# Patient Record
Sex: FEMALE | Race: WHITE | NOT HISPANIC OR LATINO | URBAN - METROPOLITAN AREA
[De-identification: names, ages, dates, MRNs, and addresses within clinical notes are randomized per-mention and may not be internally consistent; named-entity substitution may affect disease eponyms.]

---

## 2024-03-12 ENCOUNTER — MEDICAL CORRESPONDENCE (OUTPATIENT)
Dept: HEALTH INFORMATION MANAGEMENT | Facility: CLINIC | Age: 49
End: 2024-03-12
Payer: COMMERCIAL

## 2024-03-12 ENCOUNTER — TRANSFERRED RECORDS (OUTPATIENT)
Dept: HEALTH INFORMATION MANAGEMENT | Facility: CLINIC | Age: 49
End: 2024-03-12
Payer: COMMERCIAL

## 2024-03-12 LAB — PHQ9 SCORE: 4

## 2024-03-22 ENCOUNTER — TRANSCRIBE ORDERS (OUTPATIENT)
Dept: OTHER | Age: 49
End: 2024-03-22

## 2024-03-22 ENCOUNTER — PATIENT OUTREACH (OUTPATIENT)
Dept: ONCOLOGY | Facility: CLINIC | Age: 49
End: 2024-03-22
Payer: COMMERCIAL

## 2024-03-22 DIAGNOSIS — Z80.3 FAMILY HISTORY OF MALIGNANT NEOPLASM OF BREAST: Primary | ICD-10-CM

## 2024-03-22 DIAGNOSIS — Z80.3 FAMILY HISTORY OF BREAST CANCER: ICD-10-CM

## 2024-03-22 NOTE — PROGRESS NOTES
[place] received Cancer Risk Management Program referral, referred for:    Referral for consult for genetic counseling for Family history of breast cancer; Family history of malignant neoplasm of breast. Referred by Dr. Taya Stephens MD at [facility]      Reviewed for appropriate plan, and sent to New Patient Scheduling for completion.

## 2024-08-01 ENCOUNTER — VIRTUAL VISIT (OUTPATIENT)
Dept: ONCOLOGY | Facility: CLINIC | Age: 49
End: 2024-08-01
Attending: GENETIC COUNSELOR, MS
Payer: COMMERCIAL

## 2024-08-01 DIAGNOSIS — Z80.41 FAMILY HISTORY OF MALIGNANT NEOPLASM OF OVARY: Primary | ICD-10-CM

## 2024-08-01 DIAGNOSIS — Z80.3 FAMILY HISTORY OF MALIGNANT NEOPLASM OF BREAST: ICD-10-CM

## 2024-08-01 DIAGNOSIS — Z80.8 FAMILY HISTORY OF MELANOMA: ICD-10-CM

## 2024-08-01 PROCEDURE — 96040 HC GENETIC COUNSELING, EACH 30 MINUTES: CPT | Mod: GT,95 | Performed by: GENETIC COUNSELOR, MS

## 2024-08-01 NOTE — NURSING NOTE
[facility] [address]      Is the patient currently in the state of [place]? YES    Visit mode:VIDEO    If the visit is dropped, the patient can be reconnected by: VIDEO VISIT: Text to cell phone:   Telephone Information:   Mobile [phone]       Will anyone else be joining the visit? NO  (If patient encounters technical issues they should call [phone]    How would you like to obtain your AVS? MyChart    Are changes needed to the allergy or medication list? N/A    Reason for visit: Consult      Katarina PEREZ

## 2024-08-01 NOTE — LETTER
8/1/2024      [place]az Soriano  [address] [address] [facility] [address]      Dear Colleague,    Thank you for referring your patient, Sanaz Soriano, to the [facility]. Please see a copy of my visit note below.    Virtual Visit Details    Type of service:  Video Visit   Video Start Time:  11:23 am  Video End Time: 12:04 pm    Originating Location (pt. Location): Home  Distant Location (provider location):  Off-site  Platform used for Video Visit: [facility]    8/1/2024    Referring Provider: Taya Stephens MD      Presenting Information:   I spoke with Sanaz Soriano over video today for genetic counseling to discuss her family history of cancer. This appointment was conducted virtually due to COVID-19 precautions. We talked today to review this history, cancer screening recommendations, and available genetic testing options.    Personal History:  Sanaz is a 48 year old female. She does not have any personal history of cancer.     She had her first menstrual period in 7th grade, her first child at age 26, and is premenopausal. Sanaz has her ovaries, fallopian tubes and uterus in place. She reports that she had a uterine ablation about 2 years ago. She reports that she has not used hormone replacement therapy. She has used oral contraceptives. She has clinical breast exams and mammograms and reports that her most recent mammogram was in 2024. 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 Sanaz has not had a colonoscopy. Sanaz reported former tobacco use and rare alcohol use.    Family History: (Please see scanned pedigree for detailed family history information)  Children:  She has one son (age 22) and two daughters (ages 10 and 7). Her 10 year old daughter has autism, developmental delay, epilepsy, vision problems, and other health concerns. She has had some genetic testing here at [facility] in the  past. Sanaz reports that there was an uncertain finding in a gene related to vision loss.   Siblings:  Her brother is 50 years old with no known history of cancer.   His son (Sanaz's nephew) is 17 years old and had something benign removed from his brain recently, although she cannot remember the exact details of this.   Maternal:  Her mother is 73 years old and was diagnosed with breast cancer in 2010 at age 59. Treatment included bilateral mastectomy. She also has a history of ocular melanoma at age 47 in 1998. Treatment included radiation seeds behind the eye.   Her grandmother was diagnosed with ovarian cancer in her 70s and passed away at age 72.   Her grandfather was diagnosed with bladder cancer and passed away in his late 60s-70s.   Paternal:  Her father passed away at age 66 with no known history of cancer. He had diabetes and dementia.   Her grandfather had lung cancer and passed away at age 87. He possibly had exposures due to his work as a farmer and mold in the basement of the home.     Sanaz is not aware of any genetic testing for inherited cancer risk in any of her family members.     Her maternal and paternal ancestry is unknown.      Discussion:  Sanaz's family history of cancer is suggestive of a hereditary cancer syndrome.  We reviewed the features of sporadic, familial, and hereditary cancers. In looking at Sanaz's family history, it is possible that a cancer susceptibility gene is present due to her maternal family history of breast, ovarian, and ocular melanoma.  We discussed the natural history and genetics of hereditary cancer. Based on her family history, we discussed genes in which mutations are associated with an increased risk for breast and ovarian cancer, such as the BRCA1 and BRCA2 genes. Mutations in these genes cause a condition known as Hereditary Breast and Ovarian Cancer syndrome (HBOC). Women with a mutation in either of these genes are at increased risk for breast  and ovarian cancer. There is also an increased risk for a second primary breast cancer. Men with a mutation in either of these genes are at increased risk for breast and prostate cancer. Both women and men may also be at increased risk for pancreatic cancer and melanoma (including ocular melanoma). A detailed handout regarding these genes and other genes in which mutations are associated with an increased risk for breast and gynecologic cancer will be provided to Sanaz via [employer] and can be found in the after visit summary. Topics included: inheritance pattern, cancer risks, cancer screening recommendations, and also risks, benefits and limitations of testing.    We spent some time discussing the most informative approach to genetic testing. In families suspicious for a hereditary cancer syndrome, it is always most informative to begin testing with a family member who has a history of cancer. When we begin testing in someone who has not had cancer, a positive result (detected gene mutation) would be informative; however, a negative result (no gene mutation detected) would be uninformative. Uninformative results provide little new information about cancer risks. The most informative candidate for genetic testing in Sanaz's family is her mother who has had cancers. Sanaz will talk with her about the option of genetic testing. As she is not sure if and when her mother will be able to have testing, she is interested in proceeding with her own testing at this time. She stated that she understands the limitations in interpreting a negative result for herself in the absence of testing her family members.    Based on her personal and family history, Sanaz meets current National Comprehensive Cancer Network (NCCN) criteria for genetic testing of high-penetrance breast and ovarian cancer susceptibility genes.    We discussed that there are additional genes that could cause increased risk for breast, ovarian,  melanoma, and other cancers. As many of these genes present with overlapping features in a family and accurate cancer risk cannot always be established based upon the pedigree analysis alone, it would be reasonable for Sanaz to consider panel genetic testing to analyze multiple genes at once.  We reviewed genetic testing options for Sanaz based on her personal and family history: a panel of genes associated with an increased risk for certain cancers, or larger panel options to include genes associated with increased risk for multiple different cancer types. Sanaz expressed an interest in learning as much information as possible from the testing. We discussed expanded panel options including the Common Hereditary Cancers panel and the Multi-Cancer panel. She opted for a Custom Panel (a combination of the Multi-Cancer Panel + preliminary-evidence genes for melanoma).  The [facility] Multi-Cancer Panel analyzes 70 genes associated with an increased risk for cancer: AIP, ALK, APC, KIRK, AXIN2, BAP1, BARD1, BLM, BMPR1A, BRCA1, BRCA2, BRIP1, CDC73, CDH1, CDK4, CDKN1B, CDKN2A, CHEK2, CTNNA1, DICER1, EGFR, EPCAM, FH, FLCN, GREM1, HOXB13, KIT, LZTR1, MAX, MBD4, MEN1, MET, MITF, MLH1, MSH2, MSH3, MSH6, MUTYH, NF1, NF2, NTHL1, PALB2, PDGFRA, PMS2, POLD1, POLE, POT1, SDCYY2W, PTCH1, PTEN, RAD51C, RAD51D, RB1, RET, SDHA, SDHAF2, SDHB, SDHC, SDHD, SMAD4, SMARCA4, SMARCB1, SMARCE1, STK11, SUFU, TPPE462, TP53, TSC1, TSC2, VHL.  This panel includes genes associated with hereditary cancers across multiple major organ systems, including: breast, gynecologic (ovarian, uterine/endometrial), gastrointestinal (colorectal, gastric, pancreatic), endocrine (thyroid, parathyroid, pituitary, adrenal glands), genitourinary (renal/urinary tract, prostate), skin (melanoma, basal cell carcinoma), and brain/nervous system.  Individuals who are found to carry a pathogenic variant in one of these genes have an increased risk of developing  certain cancers/tumors. Identifying those at elevated risk may guide implementation of additional screening, surveillance and interventions.    We discussed that many genes on this panel are associated with specific hereditary cancer syndromes and have published management guidelines. Other genes have medical management guidelines available to screen for certain cancers. The remaining genes are associated with increased cancer risk and may allow us to make medical recommendations when mutations are identified. Some of the genes on this expanded panel may have limited information available about specific cancer risks and therefore, there may be limited screening guidelines available. She stated that she understood potential limitations of a larger gene panel.    Due to COVID-19 precautions consent was obtained over the phone/video today. Genetic testing via a Custom Panel (a combination of the Multi-Cancer Panel + preliminary-evidence genes for melanoma) will be sent to [facility]. Sanaz opted to schedule a blood draw for testing. Turnaround time from date when sample is received at the lab: approximately 3-4 weeks.  Medical Management: For Sanaz, we reviewed that the information from genetic testing may determine:  additional cancer screening for which Sanaz may qualify (i.e. mammogram and breast MRI, more frequent colonoscopies, more frequent dermatologic exams, etc.),  options for risk reducing surgeries Sanaz could consider (i.e. bilateral mastectomy, surgery to remove her ovaries and/or uterus, etc.),    and targeted chemotherapies if she were to develop certain cancers in the future (i.e. immunotherapy for individuals with Merritt syndrome, PARP inhibitors, etc.).   These recommendations will be discussed in detail once genetic testing is completed.     Plan:  1) Today Sanaz elected to proceed with genetic testing via a Custom Panel (a combination of the Multi-Cancer Panel +  preliminary-evidence genes for melanoma) offered by [facility].  2) This information should be available in 4-5 weeks.  3) Sanaz will be scheduled for a virtual visit (phone or video) to discuss the results.    Ritika Chong MS, [facility]  Licensed, Certified Genetic Counselor  Office: [phone]  Email: [email or web address]      Again, thank you for allowing me to participate in the care of your patient.        Sincerely,        Ritika Chong GC

## 2024-08-01 NOTE — PATIENT INSTRUCTIONS
Assessing Cancer Risk  Cancer is a common diagnosis which impacts many families.  Individuals may develop cancer due to environmental factors (such as exposures and lifestyle), aging, genetic predisposition, or a combination of these factors.      Only about 5-10% of cancers are thought to be due to an inherited cancer susceptibility gene.    These families often have:  Several people with the same or related types of cancer  Cancers diagnosed at a young age (before age 50)  Individuals with more than one primary cancer  Multiple generations of the family affected with cancer    Comprehensive Breast and Gynecologic Cancer Panel  We each inherit two copies of every gene in our bodies: one from our mother, and one from our father. Each gene has a specific job to do.  When a gene has a mistake or  mutation  in it, it does not work like it should.     Some people may be candidates for genetic testing of more than one gene.  For these families, genetic testing using a cancer panel may be offered. These panels will test different genes at once known to increase the risk for breast, ovarian, uterine, and/or other cancers.    This handout will review common hereditary breast and gynecologic cancer syndromes. The genes that will be discussed in this handout are: KIRK, BRCA1, BRCA2, BRIP1, CDH1, CHEK2, MLH1, MSH2, MSH6, PMS2, EPCAM, PTEN, PALB2, RAD51C, RAD51D, and TP53.    The purpose of this handout is to serve as a brief summary of the breast and gynecologic cancer risk genes that have published clinical management guidelines for individuals who are found to carry a mutation. Inheriting a mutation does not mean a person will develop cancer, but it does significantly increase their risk above the general population risk.     ______________________________________________________________________________    Hereditary Breast and Ovarian Cancer Syndrome (BRCA1 and BRCA2)  A single mutation in one of the copies of BRCA1 or  BRCA2 increases the risk for breast and ovarian cancer, among others.  The risk for pancreatic cancer and melanoma may also be slightly increased in some families.  The chart below shows the chance that someone with a BRCA mutation would develop cancer in his or her lifetime1,2,3,4.       Lifetime Cancer Risks    General Population BRCA1  BRCA2   Breast  12% >60% >60%   Ovarian  1-2% 39-58% 13-29%   Prostate 12% 7-26% 19-61%   Male Breast 0.1% 0.2-1.2% 1.8-7.1%   Pancreas 1-2% Up to 5% 5-10%     A person s ethnic background is also important to consider, as individuals of Ashkenazi Hoahaoism ancestry have a higher chance of having a BRCA gene mutation.  There are three BRCA mutations that occur more frequently in this population.      Merritt Syndrome (MLH1, MSH2, MSH6, PMS2, and EPCAM)  Currently five genes are known to cause Merritt Syndrome: MLH1, MSH2, MSH6, PMS2, and EPCAM.  A single mutation in one of the Merritt Syndrome genes increases the risk for colon, endometrial, ovarian, and stomach cancers.  Other cancers that occur less commonly in Merritt Syndrome include urinary tract, skin, and brain cancers.  The chart below shows the chance that a person with Merritt syndrome would develop cancer in his or her lifetime5.      Lifetime Cancer Risks    General Population Merritt Syndrome   Colon 5% 10-61%   Endometrial 3% 13-57%   Ovarian 1-2% 1-38%   Stomach <1% 1-9%   *Cancer risk varies depending on Merritt syndrome gene found      Cowden Syndrome (PTEN)  Cowden syndrome is a hereditary condition that increases the risk for breast, thyroid, endometrial, colon, and kidney cancer.  Cowden syndrome is caused by a mutation in the PTEN gene.  A single mutation in one of the copies of PTEN causes Cowden syndrome and increases cancer risk.  The chart below shows the chance that someone with a PTEN mutation would develop cancer in their lifetime6,7.  Other benign features seen in some individuals with Cowden syndrome include benign  skin lesions (facial papules, keratoses, lipomas), learning disability, autism, thyroid nodules, colon polyps, and larger head size.     Lifetime Cancer Risks    General Population Cowden   Breast 12% 40-60%*   Thyroid 1% Up to 38%   Renal 1-2% Up to 35%   Endometrial 3% Up to 28%   Colon 5% Up to 9%   Melanoma 2-3% Up to 6%   *Emerging data suggests the risk for breast cancer could be greater than 60%               Li-Fraumeni Syndrome (TP53)  Li-Fraumeni Syndrome (LFS) is a cancer predisposition syndrome caused by a mutation in the TP53 gene. A single mutation in one of the copies of TP53 increases the risk for multiple cancers. Individuals with LFS are at an increased risk for developing cancer at a young age. The lifetime risk for development of a LFS-associated cancer is 50% by age 30 and 90% by age 60.   Core Cancers: Sarcomas, Breast, Brain, Lung, Leukemias/Lymphomas, Adrenocortical carcinomas  Other Cancers: Gastrointestinal, Thyroid, Skin, Genitourinary       Hereditary Diffuse Gastric Cancer (CDH1)  Currently, one gene is known to cause hereditary diffuse gastric cancer (HDGC): CDH1.  Individuals with HDGC are at increased risk for diffuse gastric cancer and lobular breast cancer. Of people diagnosed with HDGC, 30-50% have a mutation in the CDH1 gene.  This suggests there are likely other genes that may cause HDGC that have not been identified yet.      Lifetime Cancer Risks    General Population HDGC   Diffuse Gastric  <1% ~80%   Breast 12% 41-60%       Additional Genes    KIRK  KIRK is a moderate-risk breast cancer gene. Women who have a mutation in KIRK can have between a 2-4 fold increased risk for breast cancer compared to the general population8. KIRK mutations have also been associated with increased risk for pancreatic cancer between 5-10%9. Individuals who inherit two KIRK mutations have a condition called ataxia-telangiectasia (AT).  This rare autosomal recessive condition affects the nervous system  and immune system, and is associated with progressive cerebellar ataxia beginning in childhood. Individuals with ataxia-telangiectasia often have a weakened immune system and have an increased risk for childhood cancers.    PALB2  Mutations in PALB2 have been shown to increase the risk of breast cancer up to 41-60% in some families; where individuals fall within this risk range is dependent upon family [id number]. PALB2 mutations have also been associated with increased risk for pancreatic cancer between 5-10%.  Individuals who inherit two PALB2 mutations--one from their mother and one from their father--have a condition called Fanconi Anemia.  This rare autosomal recessive condition is associated with short stature, developmental delay, bone marrow failure, and increased risk for childhood cancers.    CHEK2   CHEK2 is a moderate-risk breast cancer gene.  Women who have a mutation in CHEK2 have around a 2-4 fold increased risk for breast cancer compared to the general population, and this risk may be higher depending upon family history.11,12,13 The risk of colon cancer may be twice as high as the general population risk of colon cancer of 5%. Mutations in CHEK2 have also been shown to increase the risk of other cancers, including prostate, however these cancer risks are currently not well understood.    BRIP1, RAD51C and RAD51D  Mutations in RAD51C and RAD51D have been shown to increase the risk of ovarian cancer and breast cancer 14,. Mutations in BRIP1 have been shown to increase the risk of ovarian cancer and possibly female breast cancer 15 .       Lifetime Cancer Risk    General Population        BRIP1   RAD51C  RAD51D   Breast 12% Not well defined 20-40% 20-40%   Ovarian 1-2% 5-15% 10-15% 10-20%     ______________________________________________________________  Inheritance  All of the cancer syndromes reviewed above are inherited in an autosomal dominant pattern.  This means that if a parent has a mutation,  each of their children will have a 50% chance of inheriting that same mutation. Therefore, each child --male or female-- would have a 50% chance of being at increased risk for developing cancer.    Image obtained from Genetics Home Reference, 2013     Mutations in some genes can occur de mally, which means that a person s mutation occurred for the first time in them and was not inherited from a parent.  Now that they have the mutation, however, it can be passed on to future generations.    Genetic Testing  Genetic testing involves a blood test and will look for any harmful mutations that are associated with increased cancer risk.  If possible, it is recommended that the person(s) who has had cancer be tested before other family members.  That person will give us the most useful information about whether or not a specific gene is associated with the cancer in the family.    Results  There are three possible results of genetic testing:  Positive--a harmful mutation was identified in one or more of the genes  Negative--no mutations were identified in any of the genes tested  Variant of unknown significance--a variation in one of the genes was identified, but it is unclear how this impacts cancer risk in the family    Advantages and Disadvantages   There are advantages and disadvantages to genetic testing.    Advantages  May clarify your cancer risk  Can help you make medical decisions  May explain the cancers in your family  May give useful information to your family members (if you share your results)    Disadvantages  Possible negative emotional impact of learning about inherited cancer risk  Uncertainty in interpreting a negative test result in some situations  Possible genetic discrimination concerns (see below)    Genetic Information Nondiscrimination Act (ROGER)  The Genetic Information Nondiscrimination Act of 2008 (ROGER) is a federal law that protects individuals from health insurance or employment discrimination  based on a genetic test result alone (with some exceptions, including employers with fewer than 15 employees, and ).  Although rare, ROGER  does not cover discrimination protections in terms of life insurance, long term care, or disability insurances.  Visit the National Human [employer] [place] website to learn more.    Reducing Cancer Risk  All of the genes described in this handout have nationally recognized cancer screening guidelines that would be recommended for individuals who test positive.  In addition to increased cancer screening, surgeries may be offered or recommended to reduce cancer risk.  Recommendations are based upon an individual s genetic test result as well as their personal and family history of cancer.    Questions to Think About Regarding Genetic Testing:  What effect will the test result have on me and my relationship with my family members if I have an inherited gene mutation?  If I don t have a gene mutation?  Should I share my test results, and how will my family react to this news, which may also affect them?  Are my children ready to learn new information that may one day affect their own health?    Hereditary Cancer Resources    FORCE: Facing Our Risk of Cancer Empowered facingourrisk.org   Bright Pink bebrightpink.org   Li-Fraumeni Syndrome Association lfsassociation.org   PTEN World PTENworld.com   No stomach for cancer, Inc. nostomachforcancer.org   Stomach cancer relief network Scrnet.org   Collaborative Group of the Americas on Inherited Colorectal Cancer (CGA) cgaicc.com    Cancer Care cancercare.org   American Cancer Society (ACS) cancer.org   National Cancer [place] (NCI) cancer.gov     Please call us if you have any questions or concerns.   Cancer Risk Management Program [phone]-CANCER ([phone])  Mendel Dang, MS [facility]  [phone]  Carey Rai, MS, [facility] [phone]  Naima Cevallos, MS, [facility]  [phone]  Kristyn Humphrey, MS, [facility]  [phone]  Ritika Chong,  MS, [facility]  [phone]  Lara Marsh, MS, [facility] [phone]  Sangeetha Hernandes, MS, [facility] [phone]    References  Latisha Alvarez PDP, Marcelino S, Jose Manuel BOUDREAUX, Alisha JE, Esteban JL, Jr N, Adeel H, Artie O, Rosanna A, Pasini B, Radirobert P, Manegma S, Anup DM, Santo N, Asher E, Winifred H, Smyth E, Tomasa J, Gronnadege J, Tra B, Tulinius H, Thorlacius S, Eerola H, Nevanlinna H, Leonidas K, Patricia OP. Average risks of breast and ovarian cancer associated with BRCA1 or BRCA2 mutations detected in case series unselected for family history: a combined analysis of 222 studies. Am J Hum Joyce. 2003;72:1117-30.  Ricky N, Lakeisha M, Rosalinda G.  BRCA1 and BRCA2 Hereditary Breast and Ovarian Cancer. Gene Reviews online. 2013.  Trey YC, Gerald S, Margareth G, Leigh S. Breast cancer risk among male BRCA1 and BRCA2 mutation carriers. J Natl Cancer Inst. 2007;99:1811-4.  Mason QUEEN, Ricky I, Stephane J, Jannette E, Talia ER, Otoniel F. Risk of breast cancer in male BRCA2 carriers. J Med Joyce. 2010;47:710-1.  National Comprehensive Cancer Network. Clinical practice guidelines in oncology, colorectal cancer screening. Available online (registration required). 2015.  Mir MH, Abigail J, Melquiades J, Dave RODRÍGUEZ, Redd MS, Eng C. Lifetime cancer risks in individuals with germline PTEN mutations. Clin Cancer Res. 2012;18:400-7.  Liane R. Cowden Syndrome: A Critical Review of the Clinical Literature. J Joyce . 2009:18:13-27.  Enedina LUNA, Myke FERRER, Juan S, Maty P, Leigh T, Tanner M, Triston B, Julian H, Linda R, Hema K, Ac L, Mason QUEEN, Anup FERRER, Amari DF, Mook MR, The Breast Cancer Susceptibility Collaboration ([place]) & Francoise GARCIA. KIRK mutations that cause ataxia-telangiectasia are breast cancer susceptibility alleles. Nature Genetics. 2006;38:873-875  Donald N , Agnes Y, Malissa J, Moises L, Olena SOTELO , Sharon ML, Belén S, Candi AG, Alina S, Anuja ML, Jennifer J , Isael R, Pieter FELIX, Emmanuel  JR, Júnior VE, Cassi M, Voabrahamstein B, Viraj N, Rick RH, Sadaf KW, and Marielena AP. KIRK mutations in patients with hereditary pancreatic cancer. Cancer Discover. 2012;2:41-46  Francisco KIRKLAND., et al. Breast-Cancer Risk in Families with Mutations in PALB2. NEJM. 2014; 371(6):497-506.  CHEK2 Breast Cancer Case-Control Consortium. CHEK2*1100delC and susceptibility to breast cancer: A collaborative analysis involving 10,860 breast cancer cases and 9,065 controls from 10 studies. Am J Hum Joyce, 74 (2004), pp. 2332-9545  Luci T, Shahrzad S, Izzy K, et al. Spectrum of Mutations in BRCA1, BRCA2, CHEK2, and TP53 in Families at High Risk of Breast Cancer. JUSTIN. 2006;295(12):0198-9375.   Yuki C, Benja D, Abdulaziz LUNA, et al. Risk of breast cancer in women with a CHEK2 mutation with and without a family history of breast cancer. J Clin Oncol. 2011;29:9868-3199.  Song H, Josss E, Ramus SJ, et al. Contribution of germline mutations in the RAD51B, RAD51C, and RAD51D genes to ovarian cancer in the population. J Clin Oncol. 2015;33(26):4038-8857. Doi:10.1200/JCO.2015.61.2408.  Cory T, Ulysses DF, Alan P, et al. Mutations in BRIP1 confer high risk of ovarian cancer. Annalise Joyce. 2011;43(11):9728-9457. doi:10.1038/ng.955.

## 2024-08-01 NOTE — PROGRESS NOTES
Virtual Visit Details    Type of service:  Video Visit   Video Start Time:  11:23 am  Video End Time: 12:04 pm    Originating Location (pt. Location): Home  Distant Location (provider location):  Off-site  Platform used for Video Visit: Kaylee    8/1/2024    Referring Provider: Taya Stephens MD      Presenting Information:   I spoke with Sanaz Soriano over video today for genetic counseling to discuss her family history of cancer. This appointment was conducted virtually due to COVID-19 precautions. We talked today to review this history, cancer screening recommendations, and available genetic testing options.    Personal History:  Sanaz is a 48 year old female. She does not have any personal history of cancer.     She had her first menstrual period in 7th grade, her first child at age 26, and is premenopausal. Sanaz has her ovaries, fallopian tubes and uterus in place. She reports that she had a uterine ablation about 2 years ago. She reports that she has not used hormone replacement therapy. She has used oral contraceptives. She has clinical breast exams and mammograms and reports that her most recent mammogram was in 2024. 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 Sanaz has not had a colonoscopy. Sanaz reported former tobacco use and rare alcohol use.    Family History: (Please see scanned pedigree for detailed family history information)  Children:  She has one son (age 22) and two daughters (ages 10 and 7). Her 10 year old daughter has autism, developmental delay, epilepsy, vision problems, and other health concerns. She has had some genetic testing here at [facility] in the past. Sanaz reports that there was an uncertain finding in a gene related to vision loss.   Siblings:  Her brother is 50 years old with no known history of cancer.   His son (Sanaz's nephew) is 17 years old and had something benign  removed from his brain recently, although she cannot remember the exact details of this.   Maternal:  Her mother is 73 years old and was diagnosed with breast cancer in 2010 at age 59. Treatment included bilateral mastectomy. She also has a history of ocular melanoma at age 47 in 1998. Treatment included radiation seeds behind the eye.   Her grandmother was diagnosed with ovarian cancer in her 70s and passed away at age 72.   Her grandfather was diagnosed with bladder cancer and passed away in his late 60s-70s.   Paternal:  Her father passed away at age 66 with no known history of cancer. He had diabetes and dementia.   Her grandfather had lung cancer and passed away at age 87. He possibly had exposures due to his work as a farmer and mold in the basement of the home.     Sanaz is not aware of any genetic testing for inherited cancer risk in any of her family members.     Her maternal and paternal ancestry is unknown.      Discussion:  Sanaz's family history of cancer is suggestive of a hereditary cancer syndrome.  We reviewed the features of sporadic, familial, and hereditary cancers. In looking at Sanaz's family history, it is possible that a cancer susceptibility gene is present due to her maternal family history of breast, ovarian, and ocular melanoma.  We discussed the natural history and genetics of hereditary cancer. Based on her family history, we discussed genes in which mutations are associated with an increased risk for breast and ovarian cancer, such as the BRCA1 and BRCA2 genes. Mutations in these genes cause a condition known as Hereditary Breast and Ovarian Cancer syndrome (HBOC). Women with a mutation in either of these genes are at increased risk for breast and ovarian cancer. There is also an increased risk for a second primary breast cancer. Men with a mutation in either of these genes are at increased risk for breast and prostate cancer. Both women and men may also be at increased risk  for pancreatic cancer and melanoma (including ocular melanoma). A detailed handout regarding these genes and other genes in which mutations are associated with an increased risk for breast and gynecologic cancer will be provided to Sanaz via [employer] and can be found in the after visit summary. Topics included: inheritance pattern, cancer risks, cancer screening recommendations, and also risks, benefits and limitations of testing.    We spent some time discussing the most informative approach to genetic testing. In families suspicious for a hereditary cancer syndrome, it is always most informative to begin testing with a family member who has a history of cancer. When we begin testing in someone who has not had cancer, a positive result (detected gene mutation) would be informative; however, a negative result (no gene mutation detected) would be uninformative. Uninformative results provide little new information about cancer risks. The most informative candidate for genetic testing in Sanaz's family is her mother who has had cancers. Sanaz will talk with her about the option of genetic testing. As she is not sure if and when her mother will be able to have testing, she is interested in proceeding with her own testing at this time. She stated that she understands the limitations in interpreting a negative result for herself in the absence of testing her family members.    Based on her personal and family history, Sanaz meets current National Comprehensive Cancer Network (NCCN) criteria for genetic testing of high-penetrance breast and ovarian cancer susceptibility genes.    We discussed that there are additional genes that could cause increased risk for breast, ovarian, melanoma, and other cancers. As many of these genes present with overlapping features in a family and accurate cancer risk cannot always be established based upon the pedigree analysis alone, it would be reasonable for Sanaz to consider  panel genetic testing to analyze multiple genes at once.  We reviewed genetic testing options for Sanaz based on her personal and family history: a panel of genes associated with an increased risk for certain cancers, or larger panel options to include genes associated with increased risk for multiple different cancer types. Sanaz expressed an interest in learning as much information as possible from the testing. We discussed expanded panel options including the Common Hereditary Cancers panel and the Multi-Cancer panel. She opted for a Custom Panel (a combination of the Multi-Cancer Panel + preliminary-evidence genes for melanoma).  The [facility] Multi-Cancer Panel analyzes 70 genes associated with an increased risk for cancer: AIP, ALK, APC, KIRK, AXIN2, BAP1, BARD1, BLM, BMPR1A, BRCA1, BRCA2, BRIP1, CDC73, CDH1, CDK4, CDKN1B, CDKN2A, CHEK2, CTNNA1, DICER1, EGFR, EPCAM, FH, FLCN, GREM1, HOXB13, KIT, LZTR1, MAX, MBD4, MEN1, MET, MITF, MLH1, MSH2, MSH3, MSH6, MUTYH, NF1, NF2, NTHL1, PALB2, PDGFRA, PMS2, POLD1, POLE, POT1, ETSOP3G, PTCH1, PTEN, RAD51C, RAD51D, RB1, RET, SDHA, SDHAF2, SDHB, SDHC, SDHD, SMAD4, SMARCA4, SMARCB1, SMARCE1, STK11, SUFU, YQRP888, TP53, TSC1, TSC2, VHL.  This panel includes genes associated with hereditary cancers across multiple major organ systems, including: breast, gynecologic (ovarian, uterine/endometrial), gastrointestinal (colorectal, gastric, pancreatic), endocrine (thyroid, parathyroid, pituitary, adrenal glands), genitourinary (renal/urinary tract, prostate), skin (melanoma, basal cell carcinoma), and brain/nervous system.  Individuals who are found to carry a pathogenic variant in one of these genes have an increased risk of developing certain cancers/tumors. Identifying those at elevated risk may guide implementation of additional screening, surveillance and interventions.    We discussed that many genes on this panel are associated with specific hereditary cancer syndromes  and have published management guidelines. Other genes have medical management guidelines available to screen for certain cancers. The remaining genes are associated with increased cancer risk and may allow us to make medical recommendations when mutations are identified. Some of the genes on this expanded panel may have limited information available about specific cancer risks and therefore, there may be limited screening guidelines available. She stated that she understood potential limitations of a larger gene panel.    Due to COVID-19 precautions consent was obtained over the phone/video today. Genetic testing via a Custom Panel (a combination of the Multi-Cancer Panel + preliminary-evidence genes for melanoma) will be sent to [facility]. Sanaz opted to schedule a blood draw for testing. Turnaround time from date when sample is received at the lab: approximately 3-4 weeks.  Medical Management: For Sanaz, we reviewed that the information from genetic testing may determine:  additional cancer screening for which Sanaz may qualify (i.e. mammogram and breast MRI, more frequent colonoscopies, more frequent dermatologic exams, etc.),  options for risk reducing surgeries Sanaz could consider (i.e. bilateral mastectomy, surgery to remove her ovaries and/or uterus, etc.),    and targeted chemotherapies if she were to develop certain cancers in the future (i.e. immunotherapy for individuals with Merritt syndrome, PARP inhibitors, etc.).   These recommendations will be discussed in detail once genetic testing is completed.     Plan:  1) Today Sanaz elected to proceed with genetic testing via a Custom Panel (a combination of the Multi-Cancer Panel + preliminary-evidence genes for melanoma) offered by [facility].  2) This information should be available in 4-5 weeks.  3) Sanaz will be scheduled for a virtual visit (phone or video) to discuss the results.    Ritika Chong MS,  [facility] [place]ensed, Certified Genetic Counselor  Office: [phone]  Email: [email or web address]